# Patient Record
Sex: MALE | Race: WHITE | URBAN - METROPOLITAN AREA
[De-identification: names, ages, dates, MRNs, and addresses within clinical notes are randomized per-mention and may not be internally consistent; named-entity substitution may affect disease eponyms.]

---

## 2017-05-01 ENCOUNTER — OFFICE VISIT (OUTPATIENT)
Dept: SURGERY | Facility: CLINIC | Age: 82
End: 2017-05-01

## 2017-05-01 VITALS
DIASTOLIC BLOOD PRESSURE: 82 MMHG | RESPIRATION RATE: 16 BRPM | HEART RATE: 53 BPM | TEMPERATURE: 98 F | BODY MASS INDEX: 23.1 KG/M2 | SYSTOLIC BLOOD PRESSURE: 130 MMHG | WEIGHT: 180 LBS | HEIGHT: 74 IN

## 2017-05-01 DIAGNOSIS — R35.1 NOCTURIA: ICD-10-CM

## 2017-05-01 DIAGNOSIS — C61 PROSTATE CANCER (HCC): Primary | ICD-10-CM

## 2017-05-01 DIAGNOSIS — R97.21 RISING PSA FOLLOWING TREATMENT FOR MALIGNANT NEOPLASM OF PROSTATE: ICD-10-CM

## 2017-05-01 DIAGNOSIS — N40.2 PROSTATE NODULE: ICD-10-CM

## 2017-05-01 DIAGNOSIS — N40.1 BENIGN NODULAR PROSTATIC HYPERPLASIA WITH LOWER URINARY TRACT SYMPTOMS: ICD-10-CM

## 2017-05-01 PROCEDURE — G0463 HOSPITAL OUTPT CLINIC VISIT: HCPCS | Performed by: UROLOGY

## 2017-05-01 PROCEDURE — 99214 OFFICE O/P EST MOD 30 MIN: CPT | Performed by: UROLOGY

## 2017-05-01 NOTE — PROGRESS NOTES
HPI:    Patient ID: Jose Miguel Hayes is a 80year old male. HPI     Hx given by pt in [place] and [facility], translated by Dr. Sheng Morrow when needed.     1.  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 4. BPH  Patient is not currently taking medication to shrink the prostate.  He is not on any Avodart or finasteride or herbal prostate medication.  Patient feels the problem is stable.      5. Prostate nodule   5 mm prostate nodule had no lymphadenopathy and probably a “small amount of fluid in the inguinal canal.” He had no obvious bony metastases; bone scan November 13, 2003, showed increased uptake eighth and ninth left lateral rib, possibly due to tumor, versus metastatic disease every day Disp:  Rfl:    atenolol (TENORMIN) 25 MG Oral Tab Take  by mouth. Take one tablet by mouth daily Disp:  Rfl:      Allergies:No Known Allergies   PHYSICAL EXAM:   Physical Exam   Constitutional: He appears well-developed and well-nourished.  No dis and likely slowly progressing. Pt no longer on ADT. Discussed treatment options with the pt including undergoing external beam radiation.  Pt indicated his understanding and decides to observe and he decides that he will get a PSA level once or twice a yea and [place] to send or fax us the results. 2.  Please continue to have Dr. Marleen Davis check the PSA twice yearly and asked that results be sent or faxed to us    3. We agree that further visits will depend on the results of the PSA    4.   Please notify us i

## 2017-05-01 NOTE — PATIENT INSTRUCTIONS
1. Proceed with plans for you to get a PSA with Dr. Junior Menchaca July 2017; please ask the laboratory and [place] to send or fax us the results.     2.  Please continue to have Dr. Junior Menchaca check the PSA twice yearly and asked that results be sent or faxed to us

## 2018-01-07 ENCOUNTER — TELEPHONE (OUTPATIENT)
Dept: SURGERY | Facility: CLINIC | Age: 83
End: 2018-01-07

## 2018-01-08 NOTE — TELEPHONE ENCOUNTER
Nurses,    Please indicate officially in epic the patient is  and cancel any upcoming appointments for him--I heard the news from acquaintances and friends.    Thanks, Dr. Bennie Harry

## 2018-01-08 NOTE — TELEPHONE ENCOUNTER
Noted and I informed Yoanna Montes De Oca. At the uro  and she will take care of this and pt does not have any upcoming appt's schd.

## (undated) NOTE — MR AVS SNAPSHOT
[place]  [address]  [phone]               Thank you for choosing us for your health care visit with Esperanza Hernandez MD.  We are glad to serve you and happy to provide you with this summ Take  by mouth.  Take one tablet by mouth daily   Commonly known as:  TENORMIN                   MyChart     Call the [employer] for assistance with your inactive [employer] account    If you have questions, you can call [phone] to talk to our MyChart Sup

## (undated) NOTE — Clinical Note
No referring provider defined for this encounter. 05/06/2017        Patient: Kristen Lemus   YOB: 1920   Date of Visit: 5/1/2017       Dear  Dr. Baylee Li,      Thank you for referring Kristen Lemus to my practice.   Please find my as Plan: Patient has current AUA score of 4, moderate voiding dysfunction category, compared to previous score of 3, mild voiding dysfunction category, on 6/15/16 per chart review. The pt is not currently taking any bladder or prostate medications.  He feels t [facility] [id number]    Document electronically generated by:  Cassidy Liriano

## (undated) NOTE — Clinical Note
May 17, 2017         [place], [id number]      Patient: Ivy Anne   YOB: 1920   Date of Visit: 5/1/2017     Dear  Dr. Cait Reyes,    Thank you for referring Ivy Anne to my practice.   Please find Plan: Patient has current AUA score of 4, moderate voiding dysfunction category, compared to previous score of 3, mild voiding dysfunction category, on 6/15/16 per chart review. The pt is not currently taking any bladder or prostate medications.  He feels t [facility] [id number]    Document electronically generated by:  Teresa Mota